# Patient Record
Sex: FEMALE | Race: WHITE
[De-identification: names, ages, dates, MRNs, and addresses within clinical notes are randomized per-mention and may not be internally consistent; named-entity substitution may affect disease eponyms.]

---

## 2019-07-17 ENCOUNTER — HOSPITAL ENCOUNTER (EMERGENCY)
Dept: HOSPITAL 8 - ED | Age: 69
Discharge: HOME | End: 2019-07-17
Payer: COMMERCIAL

## 2019-07-17 VITALS — BODY MASS INDEX: 33.67 KG/M2 | WEIGHT: 227.3 LBS | HEIGHT: 69 IN

## 2019-07-17 VITALS — SYSTOLIC BLOOD PRESSURE: 124 MMHG | DIASTOLIC BLOOD PRESSURE: 77 MMHG

## 2019-07-17 DIAGNOSIS — L02.212: Primary | ICD-10-CM

## 2019-07-17 PROCEDURE — 96372 THER/PROPH/DIAG INJ SC/IM: CPT

## 2019-07-17 PROCEDURE — 87070 CULTURE OTHR SPECIMN AEROBIC: CPT

## 2019-07-17 PROCEDURE — 87205 SMEAR GRAM STAIN: CPT

## 2019-07-17 PROCEDURE — 99284 EMERGENCY DEPT VISIT MOD MDM: CPT

## 2019-07-17 PROCEDURE — 87186 SC STD MICRODIL/AGAR DIL: CPT

## 2019-07-17 PROCEDURE — 87077 CULTURE AEROBIC IDENTIFY: CPT

## 2019-07-17 PROCEDURE — 10060 I&D ABSCESS SIMPLE/SINGLE: CPT

## 2019-07-17 NOTE — NUR
"I THINK I HAVE A SPIDER BITE ON RIGHT UPPER BACK FROM SAT AM", REDNESS, PAIN, 
SWELLING; SEEN BY PCP IN [place]- NO IMPROVEMENT WITH BACTRIM. FEVER OF 99.3



  OVERALL DOES NOT APPEAR TOXIC, HOWEVER, REPORTS WOUND QUITE A BIT WORSE. 
ROUGHLY 9 INCH TRIAGULAR/MAROON COLORED SKIN LESION NOTED. HOT TO TOUCH & QUITE 
HARD



PROVIDER AT BEDSIDE ULTRASOUNDING WOUND. PER PROVIDER TO DEFER PIV-PLAN TO 
LINETTE/PO MEDICATIONS

## 2019-07-17 NOTE — NUR
LESION LANCED THEN PACKED BY PROVIDER AFTER LOCAL ANESTHESIA BY PROVIDER

WOUND CULTURED-SENT TO LAB

 DRESSED BACITRACIN THEN W/ DRY 4x4 DRESSING 

     MEDICATED PER EMAR WITH ROCEPHIN IM MIXED WITH LIDOCAINE

    TO WATCH PATIENT FOR 15 MINUTES AS REPORTED ALLERGY TO AMOXICILLIN

## 2019-07-19 ENCOUNTER — HOSPITAL ENCOUNTER (EMERGENCY)
Dept: HOSPITAL 8 - ED | Age: 69
Discharge: HOME | End: 2019-07-19
Payer: COMMERCIAL

## 2019-07-19 VITALS — HEIGHT: 69 IN | WEIGHT: 222.67 LBS | BODY MASS INDEX: 32.98 KG/M2

## 2019-07-19 VITALS — SYSTOLIC BLOOD PRESSURE: 171 MMHG | DIASTOLIC BLOOD PRESSURE: 53 MMHG

## 2019-07-19 DIAGNOSIS — Z48.01: Primary | ICD-10-CM

## 2019-07-19 PROCEDURE — 99283 EMERGENCY DEPT VISIT LOW MDM: CPT

## 2019-07-19 PROCEDURE — 99282 EMERGENCY DEPT VISIT SF MDM: CPT

## 2019-07-21 ENCOUNTER — HOSPITAL ENCOUNTER (EMERGENCY)
Dept: HOSPITAL 8 - ED | Age: 69
Discharge: HOME | End: 2019-07-21
Payer: MEDICARE

## 2019-07-21 VITALS — SYSTOLIC BLOOD PRESSURE: 157 MMHG | DIASTOLIC BLOOD PRESSURE: 88 MMHG

## 2019-07-21 VITALS — HEIGHT: 69 IN | WEIGHT: 222.45 LBS | BODY MASS INDEX: 32.95 KG/M2

## 2019-07-21 DIAGNOSIS — Z48.01: Primary | ICD-10-CM

## 2019-07-21 PROCEDURE — 99281 EMR DPT VST MAYX REQ PHY/QHP: CPT
